# Patient Record
Sex: MALE
[De-identification: names, ages, dates, MRNs, and addresses within clinical notes are randomized per-mention and may not be internally consistent; named-entity substitution may affect disease eponyms.]

---

## 2022-01-31 ENCOUNTER — APPOINTMENT (OUTPATIENT)
Dept: UROLOGY | Facility: CLINIC | Age: 72
End: 2022-01-31
Payer: MEDICAID

## 2022-01-31 VITALS
RESPIRATION RATE: 16 BRPM | SYSTOLIC BLOOD PRESSURE: 165 MMHG | OXYGEN SATURATION: 97 % | DIASTOLIC BLOOD PRESSURE: 94 MMHG | HEART RATE: 80 BPM | WEIGHT: 155 LBS | BODY MASS INDEX: 21.7 KG/M2 | HEIGHT: 71 IN

## 2022-01-31 DIAGNOSIS — Z78.9 OTHER SPECIFIED HEALTH STATUS: ICD-10-CM

## 2022-01-31 DIAGNOSIS — I10 ESSENTIAL (PRIMARY) HYPERTENSION: ICD-10-CM

## 2022-01-31 DIAGNOSIS — Z86.79 PERSONAL HISTORY OF OTHER DISEASES OF THE CIRCULATORY SYSTEM: ICD-10-CM

## 2022-01-31 PROBLEM — Z00.00 ENCOUNTER FOR PREVENTIVE HEALTH EXAMINATION: Status: ACTIVE | Noted: 2022-01-31

## 2022-01-31 PROCEDURE — 99204 OFFICE O/P NEW MOD 45 MIN: CPT

## 2022-01-31 PROCEDURE — 36415 COLL VENOUS BLD VENIPUNCTURE: CPT

## 2022-01-31 RX ORDER — AMLODIPINE BESYLATE 10 MG/1
10 TABLET ORAL
Qty: 90 | Refills: 0 | Status: ACTIVE | COMMUNITY
Start: 2021-12-14

## 2022-01-31 NOTE — PHYSICAL EXAM
[General Appearance - Well Developed] : well developed [General Appearance - Well Nourished] : well nourished [Normal Appearance] : normal appearance [Well Groomed] : well groomed [General Appearance - In No Acute Distress] : no acute distress [Normal Station and Gait] : the gait and station were normal for the patient's age [No Focal Deficits] : no focal deficits [Oriented To Time, Place, And Person] : oriented to person, place, and time [Affect] : the affect was normal [Mood] : the mood was normal [Not Anxious] : not anxious [] : no respiratory distress [Respiration, Rhythm And Depth] : normal respiratory rhythm and effort [Exaggerated Use Of Accessory Muscles For Inspiration] : no accessory muscle use [Urethral Meatus] : meatus normal [Penis Abnormality] : normal uncircumcised penis [FreeTextEntry1] : testis descended; no penile scaring; epididymal cyst on L testicle

## 2022-01-31 NOTE — ASSESSMENT
[FreeTextEntry1] : ED\par -check labs\par -start Cialis 5mg PO daily\par -bring back for SENIA penile shaft; Trimix sent\par \par I saw and evaluated the patient with nurse practitioner Logan Thompson. \par I have confirmed the chief complaint, past medical, surgical, and social history.\par I have examined the patient. \par I have reviewed the note and interpreted auxiliary tests results. \par I have formulated the assessment and plan and discussed my recommendations of care to the nurse practitioner.\par I agree with the findings and the plan of care as documented by this  note which I edited as necessary.\par \par \par The submitted E/M billing level for this visit reflects the total time spent on the day of the visit including face-to-face time spent with the patient, non-face-to-face review of medical records and relevant information, documentation, and asynchronous communication with the patient after a visit via phone, email, or patient’s eHR portal after the visit.  \par \par The medical records reviewed are either scanned into the chart or reviewed with the patient using a patient’s electronic medical records portal for patients with records not available to [facility] via electronic transmission platforms from other institutions and labs. \par \par Time spend counseling and performing coordination of care was also included in determining the appropriate EM billing level.\par \par I have reviewed and verified information regarding the chief complaint and history recorded by the ancillary staff and/or the patient. I have independently reviewed and interpreted tests performed by other physicians and facilities as necessary. \par \par I have discussed with the patient differential diagnosis, reason for auxiliary tests if ordered, risks, benefits, alternatives, and complications of each form of therapy were discussed.

## 2022-01-31 NOTE — HISTORY OF PRESENT ILLNESS
[FreeTextEntry1] : BABATUNDE CAROLINA, a 72 year old male, presented to the office with the chief complaint of erectile dysfunction. Notices problem for the last 3 years. \par \par Morning erections present 3x a week\par \par Erection for sexual intercourse present but unsustainable after penetration.\par \par Taking 100mg Viagra PO - helps a little - but sustainability still an issue\par \par

## 2022-02-01 ENCOUNTER — NON-APPOINTMENT (OUTPATIENT)
Age: 72
End: 2022-02-01

## 2022-02-02 LAB
CHOLEST SERPL-MCNC: 241 MG/DL
ESTIMATED AVERAGE GLUCOSE: 120 MG/DL
HBA1C MFR BLD HPLC: 5.8 %
HDLC SERPL-MCNC: 75 MG/DL
LDLC SERPL CALC-MCNC: 136 MG/DL
NONHDLC SERPL-MCNC: 166 MG/DL
TESTOST FREE SERPL-MCNC: 9.8 PG/ML
TESTOST SERPL-MCNC: 835 NG/DL
TRIGL SERPL-MCNC: 147 MG/DL

## 2022-03-07 ENCOUNTER — APPOINTMENT (OUTPATIENT)
Dept: UROLOGY | Facility: CLINIC | Age: 72
End: 2022-03-07
Payer: MEDICAID

## 2022-03-07 VITALS
HEART RATE: 65 BPM | OXYGEN SATURATION: 99 % | RESPIRATION RATE: 16 BRPM | DIASTOLIC BLOOD PRESSURE: 90 MMHG | SYSTOLIC BLOOD PRESSURE: 149 MMHG

## 2022-03-07 PROCEDURE — 93980 PENILE VASCULAR STUDY: CPT

## 2022-03-07 PROCEDURE — 99214 OFFICE O/P EST MOD 30 MIN: CPT | Mod: 25

## 2022-03-07 PROCEDURE — 54235 NJX CORPORA CAVERNOSA RX AGT: CPT

## 2022-03-07 NOTE — ASSESSMENT
[FreeTextEntry1] : Erectile dysfunction.  Inadequate response to oral medication.\par \par He responded to 20 units of Trimix today.\par \par He wants to start ICI at home.\par \par Injection teaching was performed today.\par \par He was started 20 and increase by 5 units to 35 if his erection lasts less than 1 hour.\par \par Follow-up in 3 to 6 months.\par \par Intracavernosal penile injection teaching.\par Patient is here for the penile injection teaching.  He failed oral therapy for erectile dysfunction.\par \par We reviewed risks, benefits, alternatives, complications and the procedure itself.\par \par Specifically we discussed with the patient that the medication used for intracavernosal injection therapy consists of 3 or 4 different medication, specifically prostaglandin E1, papaverine, phentolamine, and occasionally very low dose of atropine.  The medication for penile injection therapy is prepared by the pharmacy.  Only special pharmacy is prepared at medication.  Each of the medication dilates vessels in the penis using different mechanisms.  Only one of the components of that preparation is approved by FDA for penile injection therapy however it has to be used in higher dose which can increase pain and intracavernosal scarring.  The approved component of the Trimix is prostaglandin E1.  It is also known as Caverject.\par \par The medication is injected by the patient in the shaft of the penis on the left or right side.  The appropriate site of the infection injection was demonstrated on the model of the penis.  Patient should not inject into the dorsal aspect of the penis or underneath the penis as he may inject into the vessels or urethra.\par \par The risk of prolonged correction, also numbness priapism was explained to the patient.  If patient has erection more than 2 hours he should take from 2-4 Sudafed's of 30 mg each total 120 mg.  Maximum dose of Sudafed per days 240 mg.  If the erection pressure persists still after Sudafed he needs to contact our office within working hours or go to the nearest ER.\par \par Patient should never have erection longer than 4 hours.  Irreversible damage to the penis can occur if priapism persists.  Patient understood the risk of priapism.\par \par Prescription was given.  Prescription for syringes was given as well.  Patient will contact us with any questions.  \par \par Total time spend teaching the patient 35 min\par

## 2022-03-07 NOTE — HISTORY OF PRESENT ILLNESS
[FreeTextEntry1] : Patient is here for the follow-up of erectile dysfunction.  He has failed oral medication.  He only gets partial erections not adequate for penetration.  He is also here for penile Doppler ultrasound.\par \par

## 2022-04-04 ENCOUNTER — RX RENEWAL (OUTPATIENT)
Age: 72
End: 2022-04-04

## 2022-06-07 ENCOUNTER — APPOINTMENT (OUTPATIENT)
Dept: UROLOGY | Facility: CLINIC | Age: 72
End: 2022-06-07
Payer: MEDICARE

## 2022-06-07 PROCEDURE — 99213 OFFICE O/P EST LOW 20 MIN: CPT

## 2022-06-07 NOTE — PHYSICAL EXAM
[Urethral Meatus] : meatus normal [Penis Abnormality] : normal circumcised penis [Urinary Bladder Findings] : the bladder was normal on palpation [Scrotum] : the scrotum was normal [Testes Mass (___cm)] : there were no testicular masses [FreeTextEntry1] : no penile scar

## 2022-06-07 NOTE — ASSESSMENT
[FreeTextEntry1] : ED\par wants to continue ICI \par will increase to 30 units\par \par no penile scarring \par \par The submitted E/M billing level for this visit reflects the total time spent on the day of the visit including documentation in EMR, face-to-face time spent with the patient, non-face-to-face review of medical records and relevant information, review of laboratory results available via [employer] portal, as well using a patient’s electronic medical records portal for patients with records not available to [facility] directly. \par \par Time spend counseling and performing coordination of care was also included in determining the appropriate EM billing level.\par \par I have reviewed and verified information regarding the chief complaint and history recorded by the ancillary staff and/or the patient. I have independently reviewed and interpreted tests performed by other physicians and facilities as necessary. I have reviewed images pertinent to providing the care to  the patient.  \par \par I have discussed with the patient differential diagnosis, reason for auxiliary tests if ordered, risks, benefits, alternatives, and complications of each form of therapy included surgical therapy. Off label use of most of medications used in andrology was reviewed. \par \par \par

## 2022-06-07 NOTE — HISTORY OF PRESENT ILLNESS
[FreeTextEntry1] : Here for follow up of ED\par \par uses ICI 20 to 25 \par \par erection lasts only few minutes \par \par

## 2023-05-18 ENCOUNTER — APPOINTMENT (OUTPATIENT)
Dept: UROLOGY | Facility: CLINIC | Age: 73
End: 2023-05-18
Payer: COMMERCIAL

## 2023-05-18 VITALS
SYSTOLIC BLOOD PRESSURE: 156 MMHG | HEART RATE: 64 BPM | OXYGEN SATURATION: 98 % | DIASTOLIC BLOOD PRESSURE: 96 MMHG | BODY MASS INDEX: 21.7 KG/M2 | TEMPERATURE: 97.8 F | WEIGHT: 155 LBS | HEIGHT: 71 IN

## 2023-05-18 DIAGNOSIS — Z12.5 ENCOUNTER FOR SCREENING FOR MALIGNANT NEOPLASM OF PROSTATE: ICD-10-CM

## 2023-05-18 PROCEDURE — 99213 OFFICE O/P EST LOW 20 MIN: CPT

## 2023-05-18 RX ORDER — PAPAVER/PHENTOLAMINE/ALPROSTAD 150-5-50
150-5-50 VIAL (EA) INTRACAVERNOSAL
Qty: 1 | Refills: 6 | Status: ACTIVE | COMMUNITY
Start: 2022-01-31 | End: 1900-01-01

## 2023-05-18 RX ORDER — SILDENAFIL 100 MG/1
100 TABLET, FILM COATED ORAL
Qty: 15 | Refills: 0 | Status: ACTIVE | COMMUNITY
Start: 1900-01-01 | End: 1900-01-01

## 2023-05-18 NOTE — HISTORY OF PRESENT ILLNESS
[FreeTextEntry1] : BABATUNDE CAROLINA is a 73 year M who presents today as a follow-up patient for ED\par \par History of erectile dysfunction, previously followed with Dr. 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 Prior to sex, he is also using trimix 30/1/10 0.3cc AND sildenafil 100mg PRIOR to sex. On this regimen, he is able to achieve a strong erection but sometimes he looses the erection during intercourse. \par \par Other medical problems include HTN. No CAD, dyspnea on exertion. No penile curvature\par \par No gross hematuria, dysuria, increased freq / urgency

## 2023-05-18 NOTE — ASSESSMENT
[FreeTextEntry1] : 73 y.o. M with\par \par #PSA Screening\par Prostate cancer screening was discussed today in detail.  I reviewed with the patient that the PSA is a nonspecific test for prostate cancer but is specific to the prostate.  We reviewed that multiple issues related to the prostate can all cause PSA to increase including prostate enlargement, prostate cancer, and prostate inflammation. \par - PSA \par \par #ED\par - PDUS reviewed - arteriogenic ED\par - Continue tadalafil daily. Discussed should use EITHER sildenafil 100mg OR ICI prior to sex but not both. Discussed he should not inject more than twice / week. \par \par We discussed ICI. He was made fully aware that several medications used may not be approved by the [employer] for this purpose, although they may be well recognized and accepted by the American Urologic Association as a treatment and diagnostic tool for impotence. He understands that there still remains a need to standardize the indications, contraindications and dosage parameters for the testing as well as treatment purposes of this procedure. He understands that the TriMix is a compounded medication and that there are other, FDA approved, injectable medications available for use. \par \par He received a brochure on injection therapy and I have taken particular care in reviewing carefully all potential complications of this procedure and made him fully aware that even a death has been reported in conjunction with this therapy. Never-the-less, weighing all risks and benefits that this procedure affords, he was willing to proceed with the use of a intracavernosal injection of the pharmacological agent prescribed and either compounded by a pharmaceutical company, or pharmacist. He was made aware that a prolonged erection (priapism) might result from penile injections and that it must be treated within four hours to prevent damage to the erectile mechanism of his penis. He acknowledged that he must notify Dr. Harmon (or the covering doctor) and have this condition treated within fours hours should it occur. He had the opportunity to ask any questions all of which were answered to his satisfaction. \par \par

## 2023-05-18 NOTE — PHYSICAL EXAM
[Normal Appearance] : normal appearance [Well Groomed] : well groomed [Abdomen Tenderness] : non-tender [Urethral Meatus] : meatus normal [Epididymis] : the epididymides were normal [Testes Tenderness] : no tenderness of the testes [Testes Mass (___cm)] : there were no testicular masses

## 2023-05-19 LAB — PSA SERPL-MCNC: 4.81 NG/ML

## 2023-05-30 ENCOUNTER — APPOINTMENT (OUTPATIENT)
Dept: MRI IMAGING | Facility: CLINIC | Age: 73
End: 2023-05-30
Payer: COMMERCIAL

## 2023-05-30 ENCOUNTER — RESULT REVIEW (OUTPATIENT)
Age: 73
End: 2023-05-30

## 2023-05-30 PROCEDURE — 72197 MRI PELVIS W/O & W/DYE: CPT

## 2023-05-30 PROCEDURE — 76498P: CUSTOM

## 2023-05-30 PROCEDURE — A9585: CPT | Mod: JW

## 2023-06-08 ENCOUNTER — RESULT REVIEW (OUTPATIENT)
Age: 73
End: 2023-06-08

## 2023-06-09 PROCEDURE — 76377 3D RENDER W/INTRP POSTPROCES: CPT

## 2023-06-12 ENCOUNTER — APPOINTMENT (OUTPATIENT)
Dept: UROLOGY | Facility: CLINIC | Age: 73
End: 2023-06-12

## 2023-06-13 ENCOUNTER — NON-APPOINTMENT (OUTPATIENT)
Age: 73
End: 2023-06-13

## 2023-06-15 ENCOUNTER — APPOINTMENT (OUTPATIENT)
Dept: UROLOGY | Facility: CLINIC | Age: 73
End: 2023-06-15

## 2023-06-15 ENCOUNTER — OUTPATIENT (OUTPATIENT)
Dept: OUTPATIENT SERVICES | Facility: HOSPITAL | Age: 73
LOS: 1 days | End: 2023-06-15
Payer: COMMERCIAL

## 2023-06-15 ENCOUNTER — APPOINTMENT (OUTPATIENT)
Dept: UROLOGY | Facility: CLINIC | Age: 73
End: 2023-06-15
Payer: MEDICARE

## 2023-06-15 VITALS
OXYGEN SATURATION: 97 % | HEART RATE: 66 BPM | RESPIRATION RATE: 17 BRPM | SYSTOLIC BLOOD PRESSURE: 149 MMHG | DIASTOLIC BLOOD PRESSURE: 56 MMHG

## 2023-06-15 PROCEDURE — 76377 3D RENDER W/INTRP POSTPROCES: CPT | Mod: 26

## 2023-06-15 PROCEDURE — 76942 ECHO GUIDE FOR BIOPSY: CPT | Mod: 59

## 2023-06-15 PROCEDURE — 55700: CPT | Mod: 22

## 2023-06-15 PROCEDURE — 76942 ECHO GUIDE FOR BIOPSY: CPT | Mod: 26,59

## 2023-06-15 PROCEDURE — 55700: CPT

## 2023-06-16 DIAGNOSIS — R97.20 ELEVATED PROSTATE SPECIFIC ANTIGEN [PSA]: ICD-10-CM

## 2023-06-30 LAB — CORE LAB BIOPSY: NORMAL

## 2023-07-06 ENCOUNTER — APPOINTMENT (OUTPATIENT)
Dept: UROLOGY | Facility: CLINIC | Age: 73
End: 2023-07-06
Payer: COMMERCIAL

## 2023-07-06 PROCEDURE — 99214 OFFICE O/P EST MOD 30 MIN: CPT

## 2023-07-06 NOTE — PHYSICAL EXAM
[General Appearance - Well Developed] : well developed [Sclera] : the sclera and conjunctiva were normal [] : no respiratory distress [Edema] : no peripheral edema present [Abdomen Tenderness] : non-tender

## 2023-07-06 NOTE — HISTORY OF PRESENT ILLNESS
[FreeTextEntry1] : 73-year-old male who presents today for follow-up\par \par PSA May 2023: 4.81\par MRI prostate May 2023: PI-RADS 3 lesion, 31 cc prostate\par \par Prostate biopsy June 2023: [place] 3+4=7 prostate cancer in the right posterior medial apex, posterior lateral apex cores.  [place] 3+3 in right posterior medial base.  Remainder of standard and STEPHANIE cores negative\par \par He has significant ED, on cialis and trimix. \par At baseline, has minimal LUTS - has urinary frequency q2 hours when weather is cold. Otherwise denies urgency, incontinence, weak stream, UTIs, gross hematuria\par

## 2023-07-10 ENCOUNTER — OUTPATIENT (OUTPATIENT)
Dept: OUTPATIENT SERVICES | Facility: HOSPITAL | Age: 73
LOS: 1 days | Discharge: HOME | End: 2023-07-10

## 2023-07-11 ENCOUNTER — APPOINTMENT (OUTPATIENT)
Dept: RADIATION ONCOLOGY | Facility: CLINIC | Age: 73
End: 2023-07-11
Payer: COMMERCIAL

## 2023-07-11 VITALS
OXYGEN SATURATION: 99 % | BODY MASS INDEX: 20.12 KG/M2 | SYSTOLIC BLOOD PRESSURE: 150 MMHG | HEART RATE: 72 BPM | HEIGHT: 71 IN | TEMPERATURE: 96.98 F | DIASTOLIC BLOOD PRESSURE: 83 MMHG | WEIGHT: 143.7 LBS

## 2023-07-11 PROCEDURE — 99204 OFFICE O/P NEW MOD 45 MIN: CPT | Mod: 25

## 2023-07-11 RX ORDER — MIRTAZAPINE 30 MG/1
30 TABLET, FILM COATED ORAL
Qty: 30 | Refills: 0 | Status: DISCONTINUED | COMMUNITY
Start: 2022-01-10 | End: 2023-07-11

## 2023-07-11 RX ORDER — TADALAFIL 5 MG/1
5 TABLET ORAL
Qty: 30 | Refills: 0 | Status: DISCONTINUED | COMMUNITY
Start: 2022-01-31 | End: 2023-07-11

## 2023-07-11 RX ORDER — ASPIRIN 81 MG/1
81 TABLET, CHEWABLE ORAL
Qty: 90 | Refills: 0 | Status: DISCONTINUED | COMMUNITY
Start: 2021-09-07 | End: 2023-07-11

## 2023-07-11 RX ORDER — TADALAFIL 5 MG/1
5 TABLET ORAL
Qty: 90 | Refills: 3 | Status: DISCONTINUED | COMMUNITY
Start: 2022-06-07 | End: 2023-07-11

## 2023-07-11 RX ORDER — DIAZEPAM 5 MG/1
5 TABLET ORAL
Qty: 1 | Refills: 0 | Status: DISCONTINUED | COMMUNITY
Start: 2023-06-13 | End: 2023-07-11

## 2023-07-11 RX ORDER — HYDROCORTISONE 1 %
12 CREAM (GRAM) TOPICAL
Qty: 225 | Refills: 0 | Status: DISCONTINUED | COMMUNITY
Start: 2022-01-18 | End: 2023-07-11

## 2023-07-11 NOTE — PHYSICAL EXAM
[Sclera] : the sclera and conjunctiva were normal [Outer Ear] : the ears and nose were normal in appearance [Nondistended] : nondistended [Abdomen Tenderness] : non-tender [Range of Motion to Joints] : range of motion to joints [] : no rash [No Focal Deficits] : no focal deficits [Normal] : oriented to person, place and time, the affect was normal, the mood was normal and not anxious

## 2023-07-11 NOTE — VITALS
[Maximal Pain Intensity: 0/10] : 0/10 [80: Normal activity with effort; some signs or symptoms of disease.] : 80: Normal activity with effort; some signs or symptoms of disease.  [ECOG Performance Status: 1 - Restricted in physically strenuous activity but ambulatory and able to carry out work of a light or sedentary nature] : Performance Status: 1 - Restricted in physically strenuous activity but ambulatory and able to carry out work of a light or sedentary nature, e.g., light house work, office work [2 - Distress Level] : Distress Level: 2

## 2023-07-14 NOTE — HISTORY OF PRESENT ILLNESS
[FreeTextEntry1] : Mr. Balderrama is a 72 yo man with newly diagnosed favorable intermediate risk prostate cancer, with a baseline PSA of 4.81 ng/mL and Job score of 3+4=7, who presents for radiotherapy recommendations. \par \par He was undergoing urologic evaluation for erectile dysfunction. A screening PSA was ordered on 5/19/23 and came back elevated at 4.81 ng/mL. \par \par  An MRI of the prostate on 5/30/23 showed a 31 cc prostate gland. There was a lesion in the left posterior medial to lateral, and the right posterior medial mid-gland to apex peripheral zone, measuring 30 x 12 x 11 mm. The tumor abutted the capsule. Bladder wall thickening and prominent anorectal varices, sigmoid diverticulosis, sigmoid wall edema and trace ascites were also noted. There was no extracapsular extension. There was no seminal vesicle invasion and the neurovascular bundles were unremarkable. There was no pelvic adenopathy and no suspicious osseous lesions. \par \par He underwent prostate biopsy on 6/30/23. Biopsy of the MRI-targeted lesion was benign. Template biopsy showed adenocarcinoma in 3 out of 12 cores. Adenocarcinoma with Job 3+3=6 from the right posterior medial base involving <5% of one core, Job 3+4=7 from the right posterior medial apex involving 90% of one core with 40% [place] 4 component, and [place] 3+4=7 from the right posterior lateral apex involving 5% of one core with 5% [place] 4 component. \par \par He is on Cialis and Trimix for erectile dysfunction. He also reports urinary frequency during the day. He denies dysuria, hematuria, incontinence, weak flow. He denies nocturia. He does have intermittent loose stools/diarrhea, depending on what he eats, which causes his hemorrhoid to flair.  Some discomfort but not painful.  Occasional small amount blood on toilet paper. Sometimes when he has to urinate, he also feels urge to have BM. His appetite is good, denies weight loss or GERD symptoms. He had colonoscopy in 2021 about, unremarkable he reports.\par

## 2023-07-14 NOTE — REVIEW OF SYSTEMS
[Urinary Frequency] : urinary frequency [IPSS Score (0-40): ___] : IPSS score: [unfilled] [EPIC-CP Score (0-60): ___] : EPIC-CP score: [unfilled] [Negative] : Allergic/Immunologic [FreeTextEntry7] : occasional diarrhea, sometimes little blood on toilet paper after bowel movement but not often. External hemorrhoid.  [FreeTextEntry8] : mild urinary frequency during the day.

## 2023-07-14 NOTE — DISEASE MANAGEMENT
[0-10] : 0 -10 ng/mL [Biopsy with Fusion] : Patient had a biopsy with fusion on [7(3+4)] : Fusion Biopsy [place] Score: 7(3+4) [] : Patient had a Prostate MRI [3] : 3 [IIB] : IIB [BiopsyDate] : 06/23 [MeasuredProstateVolume] : 31 [TotalCores] : 13 [TotalPositiveCores] : 3 [MaxCoreInvolvement] : 90

## 2023-08-03 ENCOUNTER — APPOINTMENT (OUTPATIENT)
Dept: UROLOGY | Facility: CLINIC | Age: 73
End: 2023-08-03
Payer: COMMERCIAL

## 2023-08-03 PROCEDURE — 99442: CPT

## 2023-08-03 NOTE — HISTORY OF PRESENT ILLNESS
[FreeTextEntry1] : This telephonic visit was provided via audio only technology. The patient, was located at [facility] at the time of the visit.  The provider, Ruddy Harmon, was located at [place], [place] at the time of the visit. The patient and Provider participated in the telephonic visit.  Verbal consent for telephonic services was given on 8/3/2023 by the patient.   The patient-doctor relationship has been established in a face to face fashion via real time video/audio HIPAA compliant communication using telemedicine software. The patient's identity has been confirmed. The patient was previously emailed a copy of the telemedicine consent. They have had a chance to review and has now given verbal consent and has requested care to be assessed and treated via telemedicine. They understand there may be limitations in this process, and that they may need further followup care in the office and/or hospital settings.   73-year-old male who presents today for follow-up  PSA May 2023: 4.81 MRI prostate May 2023: PI-RADS 3 lesion, 31 cc prostate  Prostate biopsy June 2023: [place] 3+4=7 prostate cancer in the right posterior medial apex, posterior lateral apex cores.  [place] 3+3 in right posterior medial base.  Remainder of standard and STEPHANIE cores negative  He has significant ED, on cialis and trimix.  At baseline, has minimal LUTS - has urinary frequency q2 hours when weather is cold. Otherwise denies urgency, incontinence, weak stream, UTIs, gross hematuria  Met with radiation oncology and discussed definitive radiation.   Still deciding on what he would like to do.

## 2023-11-07 ENCOUNTER — APPOINTMENT (OUTPATIENT)
Dept: UROLOGY | Facility: CLINIC | Age: 73
End: 2023-11-07
Payer: MEDICARE

## 2023-11-07 VITALS
SYSTOLIC BLOOD PRESSURE: 122 MMHG | DIASTOLIC BLOOD PRESSURE: 70 MMHG | BODY MASS INDEX: 20.12 KG/M2 | HEART RATE: 77 BPM | HEIGHT: 71 IN | OXYGEN SATURATION: 98 % | WEIGHT: 143.7 LBS

## 2023-11-07 PROCEDURE — 99214 OFFICE O/P EST MOD 30 MIN: CPT

## 2023-11-08 LAB — PSA SERPL-MCNC: 7.22 NG/ML

## 2023-11-22 ENCOUNTER — APPOINTMENT (OUTPATIENT)
Dept: NUCLEAR MEDICINE | Facility: IMAGING CENTER | Age: 73
End: 2023-11-22
Payer: MEDICARE

## 2023-11-22 ENCOUNTER — RESULT REVIEW (OUTPATIENT)
Age: 73
End: 2023-11-22

## 2023-11-22 ENCOUNTER — OUTPATIENT (OUTPATIENT)
Dept: OUTPATIENT SERVICES | Facility: HOSPITAL | Age: 73
LOS: 1 days | End: 2023-11-22
Payer: COMMERCIAL

## 2023-11-22 DIAGNOSIS — C61 MALIGNANT NEOPLASM OF PROSTATE: ICD-10-CM

## 2023-11-22 PROCEDURE — 78830 RP LOCLZJ TUM SPECT W/CT 1: CPT | Mod: 26

## 2023-11-22 PROCEDURE — A9561: CPT

## 2023-11-22 PROCEDURE — 78306 BONE IMAGING WHOLE BODY: CPT | Mod: 26

## 2023-11-22 PROCEDURE — 78830 RP LOCLZJ TUM SPECT W/CT 1: CPT

## 2023-11-22 PROCEDURE — 78306 BONE IMAGING WHOLE BODY: CPT

## 2023-11-29 ENCOUNTER — APPOINTMENT (OUTPATIENT)
Dept: SURGERY | Facility: CLINIC | Age: 73
End: 2023-11-29
Payer: COMMERCIAL

## 2023-11-29 VITALS
WEIGHT: 160 LBS | TEMPERATURE: 206.96 F | HEIGHT: 71 IN | SYSTOLIC BLOOD PRESSURE: 137 MMHG | DIASTOLIC BLOOD PRESSURE: 81 MMHG | RESPIRATION RATE: 18 BRPM | HEART RATE: 72 BPM | OXYGEN SATURATION: 99 % | BODY MASS INDEX: 22.4 KG/M2

## 2023-11-29 DIAGNOSIS — K62.5 HEMORRHAGE OF ANUS AND RECTUM: ICD-10-CM

## 2023-11-29 DIAGNOSIS — K64.4 RESIDUAL HEMORRHOIDAL SKIN TAGS: ICD-10-CM

## 2023-11-29 DIAGNOSIS — R15.9 FULL INCONTINENCE OF FECES: ICD-10-CM

## 2023-11-29 PROCEDURE — 99203 OFFICE O/P NEW LOW 30 MIN: CPT | Mod: 25

## 2023-11-29 PROCEDURE — 46600 DIAGNOSTIC ANOSCOPY SPX: CPT

## 2023-12-04 ENCOUNTER — NON-APPOINTMENT (OUTPATIENT)
Age: 73
End: 2023-12-04

## 2024-02-20 ENCOUNTER — APPOINTMENT (OUTPATIENT)
Dept: UROLOGY | Facility: CLINIC | Age: 74
End: 2024-02-20
Payer: COMMERCIAL

## 2024-02-20 VITALS
TEMPERATURE: 207.5 F | WEIGHT: 160 LBS | OXYGEN SATURATION: 98 % | DIASTOLIC BLOOD PRESSURE: 87 MMHG | SYSTOLIC BLOOD PRESSURE: 156 MMHG | RESPIRATION RATE: 18 BRPM | HEART RATE: 58 BPM | HEIGHT: 71 IN | BODY MASS INDEX: 22.4 KG/M2

## 2024-02-20 DIAGNOSIS — C61 MALIGNANT NEOPLASM OF PROSTATE: ICD-10-CM

## 2024-02-20 DIAGNOSIS — N52.9 MALE ERECTILE DYSFUNCTION, UNSPECIFIED: ICD-10-CM

## 2024-02-20 DIAGNOSIS — R97.20 ELEVATED PROSTATE, SPECIFIC ANTIGEN [PSA]: ICD-10-CM

## 2024-02-20 PROCEDURE — 99214 OFFICE O/P EST MOD 30 MIN: CPT

## 2024-02-20 NOTE — HISTORY OF PRESENT ILLNESS
[FreeTextEntry1] : 74-year-old male who presents today for follow-up  PSA May 2023: 4.81 MRI prostate May 2023: PI-RADS 3 lesion, 31 cc prostate Prostate biopsy June 2023: [place] 3+4=7 prostate cancer in the right posterior medial apex, posterior lateral apex cores.  [place] 3+3 in right posterior medial base.  Remainder of standard and STEPHANIE cores negative Was referred to XRT - but he elected for AS PSA 11/2023 - 7.22 NM Bone scan - negative for mets  Regarding his ED, he is on sildenafil 100mg prn. Also prescribed trimix. He uses both when he is having intercourse - and this regimen works well for him  At baseline, has minimal LUTS - denies urgency, incontinence, weak stream, UTIs, gross hematuria. Content w/ urination

## 2024-02-21 LAB — PSA SERPL-MCNC: 2.47 NG/ML

## 2024-03-06 ENCOUNTER — OUTPATIENT (OUTPATIENT)
Dept: OUTPATIENT SERVICES | Facility: HOSPITAL | Age: 74
LOS: 1 days | End: 2024-03-06
Payer: COMMERCIAL

## 2024-03-06 ENCOUNTER — RESULT REVIEW (OUTPATIENT)
Age: 74
End: 2024-03-06

## 2024-03-06 ENCOUNTER — APPOINTMENT (OUTPATIENT)
Dept: MRI IMAGING | Facility: CLINIC | Age: 74
End: 2024-03-06
Payer: COMMERCIAL

## 2024-03-06 DIAGNOSIS — C61 MALIGNANT NEOPLASM OF PROSTATE: ICD-10-CM

## 2024-03-06 PROCEDURE — A9585: CPT

## 2024-03-06 PROCEDURE — 76498 UNLISTED MR PROCEDURE: CPT

## 2024-03-06 PROCEDURE — 72197 MRI PELVIS W/O & W/DYE: CPT | Mod: 26

## 2024-03-06 PROCEDURE — 76498P: CUSTOM | Mod: 26

## 2024-03-06 PROCEDURE — 72197 MRI PELVIS W/O & W/DYE: CPT

## 2024-08-27 ENCOUNTER — APPOINTMENT (OUTPATIENT)
Dept: UROLOGY | Facility: CLINIC | Age: 74
End: 2024-08-27
Payer: MEDICARE

## 2024-08-27 VITALS
BODY MASS INDEX: 22.4 KG/M2 | TEMPERATURE: 207.5 F | SYSTOLIC BLOOD PRESSURE: 122 MMHG | DIASTOLIC BLOOD PRESSURE: 71 MMHG | RESPIRATION RATE: 18 BRPM | WEIGHT: 160 LBS | HEIGHT: 71 IN | OXYGEN SATURATION: 96 % | HEART RATE: 63 BPM

## 2024-08-27 DIAGNOSIS — R97.20 ELEVATED PROSTATE, SPECIFIC ANTIGEN [PSA]: ICD-10-CM

## 2024-08-27 DIAGNOSIS — N52.9 MALE ERECTILE DYSFUNCTION, UNSPECIFIED: ICD-10-CM

## 2024-08-27 DIAGNOSIS — C61 MALIGNANT NEOPLASM OF PROSTATE: ICD-10-CM

## 2024-08-27 PROCEDURE — G2211 COMPLEX E/M VISIT ADD ON: CPT

## 2024-08-27 PROCEDURE — 99204 OFFICE O/P NEW MOD 45 MIN: CPT

## 2024-08-27 RX ORDER — SILDENAFIL 50 MG/1
50 TABLET ORAL
Qty: 30 | Refills: 1 | Status: ACTIVE | COMMUNITY
Start: 2024-08-27 | End: 1900-01-01

## 2024-08-27 NOTE — PHYSICAL EXAM
[General Appearance - Well Developed] : well developed [Extraocular Movements] : extraocular movements were intact [Normal] : supple with no thyromegaly or masses appreciated [] : no respiratory distress [Exaggerated Use Of Accessory Muscles For Inspiration] : no accessory muscle use [Edema] : no peripheral edema present [Abdomen Soft] : soft [No Prostate Nodules] : no prostate nodules

## 2024-08-27 NOTE — HISTORY OF PRESENT ILLNESS
[FreeTextEntry1] : 74-year-old male who presents today for follow-up  PSA May 2023: 4.81 MRI prostate May 2023: PI-RADS 3 lesion, 31 cc prostate Prostate biopsy June 2023: [place] 3+4=7 prostate cancer in the right posterior medial apex, posterior lateral apex cores.  [place] 3+3 in right posterior medial base.  Remainder of standard and STEPHANIE cores negative Was referred to XRT - but he elected for AS PSA 11/2023 - 7.22 PSA 2/2024 - 2.47  NM Bone scan - negative for mets  Regarding his ED, difficulty maintaining erection. He ran out of sildenafil, which works well for him. Also prescribed trimix. He uses both when he is having intercourse - and this regimen works well for him  At baseline, has minimal LUTS - denies urgency, incontinence, weak stream, UTIs, gross hematuria. Content w/ urination

## 2024-08-28 LAB — PSA SERPL-MCNC: 2.67 NG/ML

## 2024-09-12 ENCOUNTER — APPOINTMENT (OUTPATIENT)
Dept: UROLOGY | Facility: CLINIC | Age: 74
End: 2024-09-12

## 2024-09-29 ENCOUNTER — NON-APPOINTMENT (OUTPATIENT)
Age: 74
End: 2024-09-29

## 2024-09-30 RX ORDER — DIAZEPAM 5 MG/1
5 TABLET ORAL
Qty: 1 | Refills: 0 | Status: ACTIVE | COMMUNITY
Start: 2024-09-30 | End: 1900-01-01

## 2024-10-02 ENCOUNTER — APPOINTMENT (OUTPATIENT)
Dept: UROLOGY | Facility: CLINIC | Age: 74
End: 2024-10-02
Payer: MEDICARE

## 2024-10-02 VITALS
DIASTOLIC BLOOD PRESSURE: 90 MMHG | SYSTOLIC BLOOD PRESSURE: 154 MMHG | BODY MASS INDEX: 22.4 KG/M2 | OXYGEN SATURATION: 99 % | HEART RATE: 58 BPM | WEIGHT: 160 LBS | TEMPERATURE: 207.5 F | RESPIRATION RATE: 18 BRPM | HEIGHT: 71 IN

## 2024-10-02 PROCEDURE — 76872 US TRANSRECTAL: CPT

## 2024-10-02 PROCEDURE — 55700: CPT

## 2024-10-07 ENCOUNTER — NON-APPOINTMENT (OUTPATIENT)
Age: 74
End: 2024-10-07

## 2024-10-08 ENCOUNTER — TRANSCRIPTION ENCOUNTER (OUTPATIENT)
Age: 74
End: 2024-10-08

## 2024-10-08 ENCOUNTER — NON-APPOINTMENT (OUTPATIENT)
Age: 74
End: 2024-10-08

## 2024-10-08 LAB — CORE LAB BIOPSY: NORMAL

## 2024-10-09 ENCOUNTER — APPOINTMENT (OUTPATIENT)
Dept: UROLOGY | Facility: CLINIC | Age: 74
End: 2024-10-09
Payer: MEDICARE

## 2024-10-09 VITALS
HEART RATE: 70 BPM | DIASTOLIC BLOOD PRESSURE: 76 MMHG | RESPIRATION RATE: 16 BRPM | OXYGEN SATURATION: 98 % | SYSTOLIC BLOOD PRESSURE: 128 MMHG

## 2024-10-09 DIAGNOSIS — R97.20 ELEVATED PROSTATE, SPECIFIC ANTIGEN [PSA]: ICD-10-CM

## 2024-10-09 DIAGNOSIS — C61 MALIGNANT NEOPLASM OF PROSTATE: ICD-10-CM

## 2024-10-09 PROCEDURE — 99214 OFFICE O/P EST MOD 30 MIN: CPT

## 2025-01-28 ENCOUNTER — APPOINTMENT (OUTPATIENT)
Dept: UROLOGY | Facility: CLINIC | Age: 75
End: 2025-01-28
Payer: COMMERCIAL

## 2025-01-28 VITALS
RESPIRATION RATE: 16 BRPM | OXYGEN SATURATION: 97 % | SYSTOLIC BLOOD PRESSURE: 145 MMHG | WEIGHT: 160 LBS | HEIGHT: 71 IN | DIASTOLIC BLOOD PRESSURE: 81 MMHG | BODY MASS INDEX: 22.4 KG/M2 | TEMPERATURE: 208.4 F | HEART RATE: 60 BPM

## 2025-01-28 DIAGNOSIS — R97.20 ELEVATED PROSTATE, SPECIFIC ANTIGEN [PSA]: ICD-10-CM

## 2025-01-28 PROCEDURE — 99214 OFFICE O/P EST MOD 30 MIN: CPT

## 2025-01-29 LAB — PSA SERPL-MCNC: 3.35 NG/ML

## 2025-02-04 ENCOUNTER — APPOINTMENT (OUTPATIENT)
Dept: RADIATION ONCOLOGY | Facility: CLINIC | Age: 75
End: 2025-02-04
Payer: COMMERCIAL

## 2025-02-04 VITALS
RESPIRATION RATE: 16 BRPM | WEIGHT: 160 LBS | HEART RATE: 61 BPM | BODY MASS INDEX: 22.4 KG/M2 | OXYGEN SATURATION: 99 % | HEIGHT: 71 IN | SYSTOLIC BLOOD PRESSURE: 153 MMHG | DIASTOLIC BLOOD PRESSURE: 99 MMHG | TEMPERATURE: 208.04 F

## 2025-02-04 DIAGNOSIS — C61 MALIGNANT NEOPLASM OF PROSTATE: ICD-10-CM

## 2025-02-04 PROCEDURE — 99204 OFFICE O/P NEW MOD 45 MIN: CPT | Mod: GC

## 2025-02-04 PROCEDURE — 99214 OFFICE O/P EST MOD 30 MIN: CPT | Mod: GC

## 2025-02-21 ENCOUNTER — NON-APPOINTMENT (OUTPATIENT)
Age: 75
End: 2025-02-21

## 2025-08-20 ENCOUNTER — NON-APPOINTMENT (OUTPATIENT)
Age: 75
End: 2025-08-20

## 2025-09-09 ENCOUNTER — APPOINTMENT (OUTPATIENT)
Dept: RADIATION ONCOLOGY | Facility: CLINIC | Age: 75
End: 2025-09-09

## 2025-09-09 VITALS
OXYGEN SATURATION: 98 % | WEIGHT: 139.33 LBS | SYSTOLIC BLOOD PRESSURE: 157 MMHG | TEMPERATURE: 205.34 F | HEART RATE: 60 BPM | DIASTOLIC BLOOD PRESSURE: 87 MMHG | BODY MASS INDEX: 19.43 KG/M2 | RESPIRATION RATE: 16 BRPM

## 2025-09-09 DIAGNOSIS — C61 MALIGNANT NEOPLASM OF PROSTATE: ICD-10-CM

## 2025-09-09 PROCEDURE — 99215 OFFICE O/P EST HI 40 MIN: CPT | Mod: GC

## 2025-09-10 LAB — PSA SERPL-MCNC: 2.52 NG/ML

## 2025-09-12 RX ORDER — AMOXICILLIN AND CLAVULANATE POTASSIUM 875; 125 MG/1; MG/1
875-125 TABLET, COATED ORAL
Qty: 3 | Refills: 0 | Status: ACTIVE | COMMUNITY
Start: 2025-09-12 | End: 1900-01-01

## 2025-09-17 ENCOUNTER — APPOINTMENT (OUTPATIENT)
Dept: NUCLEAR MEDICINE | Facility: IMAGING CENTER | Age: 75
End: 2025-09-17
Payer: COMMERCIAL

## 2025-09-17 PROCEDURE — 78816 PET IMAGE W/CT FULL BODY: CPT | Mod: 26
